# Patient Record
Sex: MALE | Race: WHITE
[De-identification: names, ages, dates, MRNs, and addresses within clinical notes are randomized per-mention and may not be internally consistent; named-entity substitution may affect disease eponyms.]

---

## 2018-01-17 ENCOUNTER — HOSPITAL ENCOUNTER (INPATIENT)
Dept: HOSPITAL 74 - JER | Age: 63
LOS: 2 days | Discharge: HOME | DRG: 603 | End: 2018-01-19
Attending: FAMILY MEDICINE | Admitting: FAMILY MEDICINE
Payer: COMMERCIAL

## 2018-01-17 VITALS — BODY MASS INDEX: 32.5 KG/M2

## 2018-01-17 DIAGNOSIS — I10: ICD-10-CM

## 2018-01-17 DIAGNOSIS — Z79.84: ICD-10-CM

## 2018-01-17 DIAGNOSIS — E11.621: ICD-10-CM

## 2018-01-17 DIAGNOSIS — E11.65: ICD-10-CM

## 2018-01-17 DIAGNOSIS — Z87.891: ICD-10-CM

## 2018-01-17 DIAGNOSIS — L97.529: ICD-10-CM

## 2018-01-17 DIAGNOSIS — L03.116: Primary | ICD-10-CM

## 2018-01-17 LAB
ALBUMIN SERPL-MCNC: 3.5 G/DL (ref 3.4–5)
ALP SERPL-CCNC: 67 U/L (ref 45–117)
ALT SERPL-CCNC: 25 U/L (ref 12–78)
ANION GAP SERPL CALC-SCNC: 9 MMOL/L (ref 8–16)
AST SERPL-CCNC: 11 U/L (ref 15–37)
BASOPHILS # BLD: 0.3 % (ref 0–2)
BILIRUB SERPL-MCNC: 0.4 MG/DL (ref 0.2–1)
BUN SERPL-MCNC: 24 MG/DL (ref 7–18)
CALCIUM SERPL-MCNC: 8.9 MG/DL (ref 8.5–10.1)
CHLORIDE SERPL-SCNC: 96 MMOL/L (ref 98–107)
CO2 SERPL-SCNC: 29 MMOL/L (ref 21–32)
CREAT SERPL-MCNC: 1.2 MG/DL (ref 0.7–1.3)
DEPRECATED RDW RBC AUTO: 12.9 % (ref 11.9–15.9)
EOSINOPHIL # BLD: 1.4 % (ref 0–4.5)
GLUCOSE SERPL-MCNC: 313 MG/DL (ref 74–106)
HCT VFR BLD CALC: 44.8 % (ref 35.4–49)
HGB BLD-MCNC: 15.1 GM/DL (ref 11.7–16.9)
INR BLD: 0.98 (ref 0.82–1.09)
LYMPHOCYTES # BLD: 24.5 % (ref 8–40)
MCH RBC QN AUTO: 30.1 PG (ref 25.7–33.7)
MCHC RBC AUTO-ENTMCNC: 33.7 G/DL (ref 32–35.9)
MCV RBC: 89.4 FL (ref 80–96)
MONOCYTES # BLD AUTO: 9.2 % (ref 3.8–10.2)
NEUTROPHILS # BLD: 64.6 % (ref 42.8–82.8)
PLATELET # BLD AUTO: 162 K/MM3 (ref 134–434)
PMV BLD: 9.2 FL (ref 7.5–11.1)
POTASSIUM SERPLBLD-SCNC: 3.8 MMOL/L (ref 3.5–5.1)
PROT SERPL-MCNC: 6.8 G/DL (ref 6.4–8.2)
PT PNL PPP: 11.1 SEC (ref 9.98–11.88)
RBC # BLD AUTO: 5.01 M/MM3 (ref 4–5.6)
SODIUM SERPL-SCNC: 134 MMOL/L (ref 136–145)
WBC # BLD AUTO: 7.8 K/MM3 (ref 4–10)

## 2018-01-17 PROCEDURE — G0480 DRUG TEST DEF 1-7 CLASSES: HCPCS

## 2018-01-17 RX ADMIN — INSULIN ASPART SCH: 100 INJECTION, SOLUTION INTRAVENOUS; SUBCUTANEOUS at 23:44

## 2018-01-17 RX ADMIN — INSULIN ASPART SCH UNIT: 100 INJECTION, SOLUTION INTRAVENOUS; SUBCUTANEOUS at 18:58

## 2018-01-17 RX ADMIN — ROSUVASTATIN CALCIUM SCH: 5 TABLET, FILM COATED ORAL at 23:44

## 2018-01-17 RX ADMIN — CEFTRIAXONE SCH MLS/HR: 2 INJECTION, SOLUTION INTRAVENOUS at 23:50

## 2018-01-17 RX ADMIN — RANOLAZINE SCH: 500 TABLET, FILM COATED, EXTENDED RELEASE ORAL at 23:45

## 2018-01-17 RX ADMIN — METFORMIN HYDROCHLORIDE SCH MG: 500 TABLET ORAL at 18:18

## 2018-01-17 NOTE — PDOC
History of Present Illness





- General


Chief Complaint: Wound Infection


Stated Complaint: PT SENT BY PCP


Time Seen by Provider: 01/17/18 12:26


History Source: Patient


Exam Limitations: No Limitations





- History of Present Illness


Initial Comments: 





01/17/18 12:47


Patient is a 62-year-old male past medical history of an IDDM, hypertension, 

who presents emergency department today for evaluation of his wound. Patient 

states he was seen in his podiatrist office (Dr. Solano) yesterday and was 

found to have a diabetic foot ulcer to the bottom of his left foot. Upon 

further evaluation of his foot, his podiatrist also found cellulitis. Told 

patient he should present to the emergency department for IV antibiotics. 

Patient states that he has some pain to the left foot however he otherwise 

feels fine. Denies fevers, chills, flulike symptoms, chest pain, shortness of 

breath, nausea, vomiting, diarrhea, headache, dizziness and lightheadedness.





Past History





- Travel


Traveled outside of the country in the last 30 days: No


Close contact w/someone who was outside of country & ill: No





- Past Medical History


Allergies/Adverse Reactions: 


 Allergies











Allergy/AdvReac Type Severity Reaction Status Date / Time


 


No Known Allergies Allergy   Verified 01/17/18 09:37











Home Medications: 


Ambulatory Orders





Alfuzosin HCl [Alfuzosin HCl ER] 10 mg PO DAILY 01/17/18 


Dapagliflozin Propanediol [Farxiga] 10 mg PO DAILY 01/17/18 


Dulaglutide [Trulicity] 0.75 mg SCJ WEEKLY 01/17/18 


Glimepiride 4 mg PO BID 01/17/18 


Levothyroxine [Synthroid -] 50 mcg PO DAILY 01/17/18 


Losartan-Hctz 100-25 mg Tab 100 mg PO DAILY 01/17/18 


Metformin HCl [Glucophage] 1,000 mg PO BID 01/17/18 


Ranolazine [Ranexa] 500 mg PO DAILY 01/17/18 


Rosuvastatin [Crestor -] 5 mg PO HS 01/17/18 








CVA: No


COPD: No


Diabetes: Yes


HTN: Yes


Hypercholesterolemia: Yes


Thyroid Disease: Yes





- Suicide/Smoking/Psychosocial Hx


Smoking Status: No


Smoking History: Former smoker


Have you smoked in the past 12 months: No


Number of Cigarettes Smoked Daily: 0


If you are a former smoker, when did you quit?: 28 YRS


Information on smoking cessation initiated: No


Hx Alcohol Use: Yes (SOCIAL)


Drug/Substance Use Hx: No


Substance Use Type: None





**Review of Systems





- Review of Systems


Able to Perform ROS?: Yes


Comments:: 





01/17/18 13:31


CONSTITUTIONAL: 


Absent: fever, chills, diaphoresis, generalized weakness, malaise, loss of 

appetite


HEENT: 


Absent: rhinorrhea, nasal congestion, throat pain, throat swelling, difficulty 

swallowing, mouth swelling, ear pain, eye pain, visual Changes


CARDIOVASCULAR: 


Absent: chest pain, loss of consciousness, palpitations, irregular heart rate, 

peripheral edema


RESPIRATORY: 


Absent: cough, shortness of breath, dyspnea with exertion, orthopnea, wheezing, 

stridor, hemoptysis


GASTROINTESTINAL:


Absent: abdominal pain, abdominal distension, nausea, vomiting, diarrhea, 

constipation, melena, hematochezia


GENITOURINARY: 


Absent: dysuria, frequency, urgency, hesitancy, hematuria, flank pain, genital 

pain


MUSCULOSKELETAL: 


Absent: myalgia, arthralgia, joint swelling


SKIN: 


Present: foot ulcer L. Absent: rash, itching, pallor


HEMATOLOGIC/IMMUNOLOGIC: 


Absent: easy bleeding, easy bruising, lymphadenopathy, frequent infections


ENDOCRINE:


Absent: unexplained weight gain, unexplained weight loss, heat intolerance, 

cold intolerance


NEUROLOGIC: 


Absent: headache, focal weakness or paresthesias, dizziness, unsteady gait, 

seizure, mental status changes, bladder or bowel incontinence


PSYCHIATRIC: 


Absent: anxiety, depression, suicidal or homicidal ideation, hallucinations.


Is the patient limited English proficient: No





*Physical Exam





- Vital Signs


 Last Vital Signs











Temp Pulse Resp BP Pulse Ox


 


 98.0 F   104 H  20   133/76   97 


 


 01/17/18 09:34  01/17/18 09:34  01/17/18 09:34  01/17/18 09:34  01/17/18 09:34














- Physical Exam


Comments: 





01/17/18 13:32


GENERAL:


Well developed, well nourished. Awake and alert x3. No acute distress. 

Breathing easily on exam bed. 


HEENT:


Normocephalic, atraumatic. PERRLA, EOMI. No conjunctival pallor. Sclera are non-

icteric. Moist mucous membranes. Oropharynx is clear.


NECK: 


Supple. Full ROM. No JVD. Carotid pulses 2+ and symmetric, without bruits. No 

thyromegaly. No lymphadenopathy.


CARDIOVASCULAR:


Regular rate and rhythm. No murmurs, rubs, or gallops. Distal pulses are 2+ and 

symmetric. 


PULMONARY: 


No evidence of respiratory distress. Lungs clear to auscultation bilaterally. 

No wheezing, rales or rhonchi.


ABDOMINAL:


Soft. Non-tender. Non-distended. No rebound or guarding. No organomegaly. 

Normoactive bowel sounds. 


MUSCULOSKELETAL 


Normal range of motion at all joints. No bony deformities or tenderness. No CVA 

tenderness.


EXTREMITIES: 


No cyanosis. No clubbing. No edema. No calf tenderness.


SKIN: 


2cm round stage II/III diabetic foot ulcer to the sole of the L foot. Streaking 

cellulitis to the sole of the foot and ankle. No crepitus felt on exam.Warm and 

dry. Normal capillary refill. No rashes. No jaundice. 


NEUROLOGICAL: 


Alert, awake, appropriate. Cranial nerves 2-12 intact. No deficits to light 

touch and temperature in face, upper extremities and lower extremities. No 

motor deficits in the in face, upper extremities and lower extremities. 

Normoreflexic in the upper and lower extremities. Normal speech. Toes are down-

going bilaterally. Gait is normal without ataxia.


PSYCHIATRIC: 


Cooperative. Good eye contact. Appropriate mood and affect.





ED Treatment Course





- LABORATORY


CBC & Chemistry Diagram: 


 01/17/18 13:25





 01/17/18 13:25





Medical Decision Making





- Medical Decision Making





01/17/18 13:26


Patient is a 62-year-old male past medical history of NIDDM, hypertension, who 

presents to the emergency department for infected foot ulcer of his left sole 

with associated cellulitis. Patient is currently afebrile in the department 

vital signs are stable. We will draw basic lab work, blood cultures and start 

IV antibiotics at this time. Anticipate admission for further management of his 

foot ulcer.





1.CBC, CMP, PT/INR, blood cultures


2.wound culture


3.IV Zosyn and Vanc


4.reevaluate





01/17/18 14:46


No leukocytosis, blood glucose is 300 at this time. We'll order 5 units of 

insulin. Other electrolytes are within normal limits. 


Dr. Jaimes paged for admission.





01/17/18 15:01


Spoke with Dr. Jaimes, case discussed and accepts pt for admission. Requests 

Dr. Oscar's group for ID consult.











*DC/Admit/Observation/Transfer


Diagnosis at time of Disposition: 


 Cellulitis of left foot





Diabetic foot ulcer


Qualifiers:


 Diabetic foot ulcer location: midfoot Diabetes mellitus type: type 2 Laterality

: left Non-pressure ulcer stage: with muscle involvement without evidence of 

necrosis Qualified Code(s): E11.621 - Type 2 diabetes mellitus with foot ulcer





Diabetes type 2, uncontrolled


Qualifiers:


 Diabetes mellitus complication status: with skin complications Diabetes 

mellitus complication detail: with foot ulcer Diabetes mellitus long term 

insulin use: without long term use Qualified Code(s): E11.621 - Type 2 diabetes 

mellitus with foot ulcer








- Discharge Dispostion


Condition at time of disposition: Stable


Admit: Yes





- Referrals





- Patient Instructions





- Post Discharge Activity

## 2018-01-18 LAB
ALBUMIN SERPL-MCNC: 3.5 G/DL (ref 3.4–5)
ALP SERPL-CCNC: 56 U/L (ref 45–117)
ALT SERPL-CCNC: 23 U/L (ref 12–78)
ANION GAP SERPL CALC-SCNC: 9 MMOL/L (ref 8–16)
APPEARANCE UR: CLEAR
AST SERPL-CCNC: 10 U/L (ref 15–37)
BILIRUB SERPL-MCNC: 0.3 MG/DL (ref 0.2–1)
BILIRUB UR STRIP.AUTO-MCNC: NEGATIVE MG/DL
BUN SERPL-MCNC: 23 MG/DL (ref 7–18)
CALCIUM SERPL-MCNC: 8.8 MG/DL (ref 8.5–10.1)
CHLORIDE SERPL-SCNC: 101 MMOL/L (ref 98–107)
CHOLEST SERPL-MCNC: 161 MG/DL (ref 50–200)
CO2 SERPL-SCNC: 29 MMOL/L (ref 21–32)
COLOR UR: YELLOW
CREAT SERPL-MCNC: 0.9 MG/DL (ref 0.7–1.3)
DEPRECATED RDW RBC AUTO: 12.8 % (ref 11.9–15.9)
EPITH CASTS URNS QL MICRO: (no result) /HPF
GLUCOSE SERPL-MCNC: 180 MG/DL (ref 74–106)
HCT VFR BLD CALC: 45.2 % (ref 35.4–49)
HDLC SERPL-MCNC: 47 MG/DL (ref 40–60)
HGB BLD-MCNC: 15.2 GM/DL (ref 11.7–16.9)
HYALINE CASTS URNS QL MICRO: 1 /LPF
KETONES UR QL STRIP: NEGATIVE
LDLC SERPL CALC-MCNC: 90 MG/DL (ref 5–100)
LEUKOCYTE ESTERASE UR QL STRIP.AUTO: (no result)
MCH RBC QN AUTO: 30.2 PG (ref 25.7–33.7)
MCHC RBC AUTO-ENTMCNC: 33.6 G/DL (ref 32–35.9)
MCV RBC: 89.9 FL (ref 80–96)
MUCOUS THREADS URNS QL MICRO: (no result)
NITRITE UR QL STRIP: NEGATIVE
PH UR: 5 [PH] (ref 5–8)
PLATELET # BLD AUTO: 158 K/MM3 (ref 134–434)
PMV BLD: 8.9 FL (ref 7.5–11.1)
POTASSIUM SERPLBLD-SCNC: 4 MMOL/L (ref 3.5–5.1)
PROT SERPL-MCNC: 6.6 G/DL (ref 6.4–8.2)
PROT UR QL STRIP: (no result)
PROT UR QL STRIP: NEGATIVE
RBC # BLD AUTO: 5.03 M/MM3 (ref 4–5.6)
RBC # UR STRIP: NEGATIVE /UL
SODIUM SERPL-SCNC: 139 MMOL/L (ref 136–145)
SP GR UR: 1.03 (ref 1–1.03)
TRIGL SERPL-MCNC: 166 MG/DL (ref 35–160)
UROBILINOGEN UR STRIP-MCNC: NEGATIVE MG/DL (ref 0.2–1)
WBC # BLD AUTO: 6.6 K/MM3 (ref 4–10)

## 2018-01-18 RX ADMIN — LOSARTAN POTASSIUM AND HYDROCHLOROTHIAZIDE TABLETS SCH TAB: 50; 12.5 TABLET, FILM COATED ORAL at 10:35

## 2018-01-18 RX ADMIN — INSULIN ASPART SCH UNIT: 100 INJECTION, SOLUTION INTRAVENOUS; SUBCUTANEOUS at 16:58

## 2018-01-18 RX ADMIN — INSULIN ASPART SCH UNIT: 100 INJECTION, SOLUTION INTRAVENOUS; SUBCUTANEOUS at 06:33

## 2018-01-18 RX ADMIN — RANOLAZINE SCH MG: 500 TABLET, FILM COATED, EXTENDED RELEASE ORAL at 10:35

## 2018-01-18 RX ADMIN — TAZOBACTAM SODIUM AND PIPERACILLIN SODIUM SCH: 500; 4 INJECTION, SOLUTION INTRAVENOUS at 18:15

## 2018-01-18 RX ADMIN — TAZOBACTAM SODIUM AND PIPERACILLIN SODIUM SCH MLS/HR: 500; 4 INJECTION, SOLUTION INTRAVENOUS at 15:29

## 2018-01-18 RX ADMIN — RANOLAZINE SCH MG: 500 TABLET, FILM COATED, EXTENDED RELEASE ORAL at 21:17

## 2018-01-18 RX ADMIN — INSULIN ASPART SCH UNIT: 100 INJECTION, SOLUTION INTRAVENOUS; SUBCUTANEOUS at 21:18

## 2018-01-18 RX ADMIN — INSULIN ASPART SCH UNIT: 100 INJECTION, SOLUTION INTRAVENOUS; SUBCUTANEOUS at 11:24

## 2018-01-18 RX ADMIN — CEFTRIAXONE SCH MLS/HR: 2 INJECTION, SOLUTION INTRAVENOUS at 10:36

## 2018-01-18 RX ADMIN — METFORMIN HYDROCHLORIDE SCH MG: 500 TABLET ORAL at 06:33

## 2018-01-18 RX ADMIN — ROSUVASTATIN CALCIUM SCH MG: 5 TABLET, FILM COATED ORAL at 21:17

## 2018-01-18 RX ADMIN — GLIMEPIRIDE SCH MG: 4 TABLET ORAL at 16:58

## 2018-01-18 NOTE — HP
Admitting History and Physical





- Primary Care Physician


PCP: Mei Jaimes





- Admission


Chief Complaint: Diabetic foot ulcer


History Source: Patient, Medical Record


Limitations to Obtaining History: No Limitations





- Past Medical History


Cardiovascular: Yes: HTN, Hyperlipdemia, Other (Angina)


Endocrine: Yes: Diabetes Mellitus, Hypothyroidism





- Past Surgical History


Past Surgical History: Yes: None





- Smoking History


Smoking history: Former smoker


Have you smoked in the past 12 months: No


Aproximately how many cigarettes per day: 0


If you are a former smoker, when did you quit?: 28 YRS





- Alcohol/Substance Use


Hx Alcohol Use: Yes (SOCIAL)





- Social History


ADL: Independent


Occupation: retired


History of Recent Travel: No





Home Medications





- Allergies


Allergies/Adverse Reactions: 


 Allergies











Allergy/AdvReac Type Severity Reaction Status Date / Time


 


No Known Allergies Allergy   Verified 01/17/18 09:37














- Home Medications


Home Medications: 


Ambulatory Orders





Alfuzosin HCl [Alfuzosin HCl ER] 10 mg PO DAILY 01/17/18 


Dapagliflozin Propanediol [Farxiga] 10 mg PO DAILY 01/17/18 


Dulaglutide [Trulicity] 0.75 mg SCJ WEEKLY 01/17/18 


Glimepiride 4 mg PO BID 01/17/18 


Levothyroxine [Synthroid -] 50 mcg PO DAILY 01/17/18 


Losartan-Hctz 100-25 mg Tab 100 mg PO DAILY 01/17/18 


Metformin HCl [Glucophage] 1,000 mg PO BID 01/17/18 


Ranolazine [Ranexa] 500 mg PO DAILY 01/17/18 


Rosuvastatin [Crestor -] 5 mg PO HS 01/17/18 











Review of Systems





- Review of Systems


Constitutional: reports: No Symptoms


Eyes: reports: No Symptoms


HENT: reports: No Symptoms


Neck: reports: No Symptoms


Cardiovascular: reports: No Symptoms


Respiratory: reports: No Symptoms


Gastrointestinal: reports: No Symptoms


Genitourinary: reports: No Symptoms


Breasts: reports: No Symptoms Reported


Musculoskeletal: reports: No Symptoms


Integumentary: reports: Erythema, Wound


Neurological: reports: No Symptoms


Endocrine: reports: No Symptoms


Hematology/Lymphatic: reports: No Symptoms


Psychiatric: reports: No Symptoms





Physical Examination


Vital Signs: 


 Vital Signs











Temperature  98.6 F   01/18/18 08:46


 


Pulse Rate  93 H  01/18/18 08:46


 


Respiratory Rate  16   01/18/18 08:46


 


Blood Pressure  139/80   01/18/18 08:46


 


O2 Sat by Pulse Oximetry (%)  98   01/18/18 00:00











Findings/Remarks: 





NAD, sitting in the chair


IV abx


seen by ID


afebrile


Constitutional: Yes: Well Nourished, No Distress, Calm


Cardiovascular: Yes: Regular Rate and Rhythm


Respiratory: Yes: Regular


Gastrointestinal: Yes: Normal Bowel Sounds, Soft


Musculoskeletal: Yes: WNL


Extremities: Yes: Erythema (Left dorsal foot)


Edema: No


Peripheral Pulses WNL: Yes


Neurological: Yes: Alert, Oriented


Psychiatric: Yes: Alert, Oriented


Labs: 


 CBC, BMP





 01/18/18 08:00 





 01/18/18 08:00 











Imaging





- Results


X-ray: Report Reviewed





Problem List





- Problems


(1) Cellulitis of left foot


Assessment/Plan: 


ID consult


-IV abx


-Podiatry consult


-pain management


-MRI left foot


Code(s): L03.116 - CELLULITIS OF LEFT LOWER LIMB   





(2) Diabetes type 2, uncontrolled


Assessment/Plan: 


-endocrinology consult


-insulin sliding scale


-Diabetic diet


-metformin on hold during inpatient stay


-on glimepride and farxiga outpatient, continue glimeperide inpatient, hospital 

doesn't carry any SGLT2 inhibitors


Code(s): E11.65 - TYPE 2 DIABETES MELLITUS WITH HYPERGLYCEMIA   


Qualifiers: 


   Diabetes mellitus complication status: with skin complications   Diabetes 

mellitus complication detail: with foot ulcer   Diabetes mellitus long term 

insulin use: without long term use   Qualified Code(s): E11.621 - Type 2 

diabetes mellitus with foot ulcer; E11.65 - Type 2 diabetes mellitus with 

hyperglycemia; E11.65 - Type 2 diabetes mellitus with hyperglycemia; E11.65 - 

Type 2 diabetes mellitus with hyperglycemia; E11.65 - Type 2 diabetes mellitus 

with hyperglycemia; L97.509 - Non-pressure chronic ulcer of other part of 

unspecified foot with unspecified severity; L97.509 - Non-pressure chronic 

ulcer of other part of unspecified foot with unspecified severity; L97.509 - Non

-pressure chronic ulcer of other part of unspecified foot with unspecified 

severity; L97.509 - Non-pressure chronic ulcer of other part of unspecified 

foot with unspecified severity   





Assessment/Plan





see problem list

## 2018-01-18 NOTE — PN
Progress Note, Physician


Chief Complaint: 





ID











Vancomycin   Ceftriaxone





No complaints





 





- Current Medication List


Current Medications: 


Active Medications





Acetaminophen (Tylenol -)  650 mg PO Q6H PRN


   PRN Reason: PAIN OR FEVER


Glimepiride (Amaryl -)  4 mg PO BIDI [facility]


HCTZ/Losartan Potassium (Hyzaar -)  1 tab PO DAILY [facility]


   Last Admin: 01/18/18 10:35 Dose:  1 tab


CEFTRIAXONE IN IS-OSM DEXTROSE (Ceftriaxone 2 Gm-D5w Bag)  2 gm in 50 mls @ 100 

mls/hr IVPB DAILY [facility]


   Last Admin: 01/18/18 10:56 Dose:  Not Given


Vancomycin HCl 1,250 mg/ (Sodium Chloride)  250 mls @ 166.667 mls/hr IVPB DAILY@

1500 [facility]


   PRN Reason: Protocol


Insulin Aspart (Novolog Vial Sliding Scale -)  1 vial SQ ACHS [facility]


   PRN Reason: Protocol


   Last Admin: 01/18/18 11:24 Dose:  6 unit


Levothyroxine Sodium (Synthroid -)  50 mcg PO AM [facility]


Ranolazine (Ranexa -)  500 mg PO BID [facility]


   Last Admin: 01/18/18 10:35 Dose:  500 mg


Rosuvastatin Calcium (Crestor -)  5 mg PO HS [facility]


   Last Admin: 01/17/18 23:44 Dose:  Not Given


Tamsulosin HCl (Flomax -)  0.4 mg PO DAILY@0830 [facility]











- Objective


Vital Signs: 


 Vital Signs











Temperature  98.6 F   01/18/18 08:46


 


Pulse Rate  93 H  01/18/18 08:46


 


Respiratory Rate  16   01/18/18 08:46


 


Blood Pressure  139/80   01/18/18 08:46


 


O2 Sat by Pulse Oximetry (%)  99   01/18/18 09:00











HENT: Yes: WNL, Atraumatic


Neck: Yes: WNL, Supple


Cardiovascular: Yes: S1, S2


Respiratory: Yes: WNL, Regular, CTA Bilaterally


Gastrointestinal: Yes: WNL, Normal Bowel Sounds, Soft.  No: Tenderness


Extremities: Yes: Other (Plantar ulcer swellling redness better minimal drainage

)


Labs: 


 CBC, BMP





 01/18/18 08:00 





 01/18/18 08:00 





 INR, PTT











INR  0.98  (0.82-1.09)   01/17/18  13:25    














Assessment/Plan





Microbiology





01/17/18 13:25   Blood - Peripheral Venous   Blood Culture - Preliminary


                              NO GROWTH OBTAINED AFTER 24 HOURS, INCUBATION TO 

CONTINUE


                              FOR 4 DAYS.





 Laboratory Tests











  01/18/18 01/18/18 01/18/18





  08:00 08:00 08:00


 


Hgb  15.2  


 


ESR    23 H


 


BUN   23 H 


 


Creatinine   0.9  D 








Assessment Diabetic foot infection plantar ulcer cellultitis rule out osteo ESR 

CRP low but not diagnostic for osteo





Plan             Continue Antibiotics


                   Vancom Zosyn


                   MRI foot rule out osteo

## 2018-01-19 VITALS — DIASTOLIC BLOOD PRESSURE: 68 MMHG | HEART RATE: 86 BPM | SYSTOLIC BLOOD PRESSURE: 108 MMHG

## 2018-01-19 VITALS — TEMPERATURE: 97.6 F

## 2018-01-19 RX ADMIN — TAZOBACTAM SODIUM AND PIPERACILLIN SODIUM SCH MLS/HR: 500; 4 INJECTION, SOLUTION INTRAVENOUS at 01:33

## 2018-01-19 RX ADMIN — RANOLAZINE SCH MG: 500 TABLET, FILM COATED, EXTENDED RELEASE ORAL at 10:13

## 2018-01-19 RX ADMIN — LOSARTAN POTASSIUM AND HYDROCHLOROTHIAZIDE TABLETS SCH TAB: 50; 12.5 TABLET, FILM COATED ORAL at 10:13

## 2018-01-19 RX ADMIN — TAZOBACTAM SODIUM AND PIPERACILLIN SODIUM SCH MLS/HR: 500; 4 INJECTION, SOLUTION INTRAVENOUS at 10:13

## 2018-01-19 RX ADMIN — GLIMEPIRIDE SCH MG: 4 TABLET ORAL at 06:18

## 2018-01-19 RX ADMIN — INSULIN ASPART SCH UNIT: 100 INJECTION, SOLUTION INTRAVENOUS; SUBCUTANEOUS at 06:19

## 2018-01-19 RX ADMIN — INSULIN ASPART SCH UNIT: 100 INJECTION, SOLUTION INTRAVENOUS; SUBCUTANEOUS at 11:35

## 2018-01-19 NOTE — DS
Physical Examination


Vital Signs: 


 Vital Signs











Temperature  98.5 F   01/19/18 05:54


 


Pulse Rate  82   01/19/18 05:54


 


Respiratory Rate  20   01/19/18 05:54


 


Blood Pressure  125/63   01/19/18 05:54


 


O2 Sat by Pulse Oximetry (%)  99   01/18/18 20:46











Constitutional: Yes: Well Nourished, No Distress, Calm


Cardiovascular: Yes: Regular Rate and Rhythm


Respiratory: Yes: Regular


Musculoskeletal: Yes: WNL


Extremities: Yes: WNL


Edema: No


Peripheral Pulses WNL: Yes


Neurological: Yes: Alert, Oriented


Psychiatric: Yes: Alert, Oriented


Labs: 


 CBC, BMP





 01/18/18 08:00 





 01/18/18 08:00 











Discharge Summary


Reason For Visit: DIABETIC FOOT ULCER


Current Active Problems





Cellulitis of left foot (Acute)


Diabetes type 2, uncontrolled (Acute)


Diabetic foot ulcer (Acute)








Hospital Course: 





Patient is a 62-year-old male past medical history of an IDDM, hypertension, 

who presents emergency department today for evaluation of his wound. Patient 

states he was seen in his podiatrist office (Dr. Solano) yesterday and was 

found to have a diabetic foot ulcer to the bottom of his left foot. Upon 

further evaluation of his foot, his podiatrist also found cellulitis. Told 

patient he should present to the emergency department for IV antibiotics. 

Patient states that he has some pain to the left foot however he otherwise 

feels fine. Denies fevers, chills, flulike symptoms, chest pain, shortness of 

breath, nausea, vomiting, diarrhea, headache, dizziness and lightheadedness.


During his stay he was evaluated by ID and was treated with IV abx-Zosyn and 

vancomycin. The wound culture is pending. he also had MRI LLE which could not 

exclude osteomyelitis.


Condition: Stable





- Instructions


Diet, Activity, Other Instructions: 


-Low sodium diabetic diet


-Augmentin 875-125 mg 2 x day for 7 days


-Follow up with Podiatry Dr Mcgowan within 1 week


-Follow up with PCP within 2 weeks


Referrals: 


Mei Jaimes MD [Primary Care Provider] - 


Disposition: VNS/HOME HEALTH CARE





- Home Medications


Comprehensive Discharge Medication List: 


Ambulatory Orders





Alfuzosin HCl [Alfuzosin HCl ER] 10 mg PO DAILY 01/17/18 


Dapagliflozin Propanediol [Farxiga] 10 mg PO DAILY 01/17/18 


Dulaglutide [Trulicity] 0.75 mg SCJ WEEKLY 01/17/18 


Glimepiride 4 mg PO BID 01/17/18 


Levothyroxine [Synthroid -] 50 mcg PO DAILY 01/17/18 


Losartan-Hctz 100-25 mg Tab 100 mg PO DAILY 01/17/18 


Metformin HCl [Glucophage] 1,000 mg PO BID 01/17/18 


Ranolazine [Ranexa] 500 mg PO DAILY 01/17/18 


Rosuvastatin [Crestor -] 5 mg PO HS 01/17/18

## 2018-01-19 NOTE — PN
Progress Note, Physician


Chief Complaint: 





Left foot diabetic ulcer


History of Present Illness: 





NAD, self ambulatory


seen by ID


IV abx


MRI possible osteo?





- Current Medication List


Current Medications: 


Active Medications





Acetaminophen (Tylenol -)  650 mg PO Q6H PRN


   PRN Reason: PAIN OR FEVER


Glimepiride (Amaryl -)  4 mg PO BIDI [facility]


   Last Admin: 01/19/18 06:18 Dose:  4 mg


HCTZ/Losartan Potassium (Hyzaar -)  1 tab PO DAILY [facility]


   Last Admin: 01/19/18 10:13 Dose:  1 tab


Vancomycin HCl 1,250 mg/ (Sodium Chloride)  250 mls @ 166.667 mls/hr IVPB DAILY@

1500 [facility]


   PRN Reason: Protocol


   Last Admin: 01/18/18 18:57 Dose:  166.667 mls/hr


Piperacillin/Tazobactam/Dextrose (Zosyn 4.5gm Ivpb (Premix))  4.5 gm in 100 mls 

@ 200 mls/hr IVPB Q8H-IV [facility]


   PRN Reason: Protocol


   Last Admin: 01/19/18 10:13 Dose:  200 mls/hr


Insulin Aspart (Novolog Vial Sliding Scale -)  1 vial SQ ACHS [facility]


   PRN Reason: Protocol


   Last Admin: 01/19/18 06:19 Dose:  2 unit


Levothyroxine Sodium (Synthroid -)  50 mcg PO AM [facility]


   Last Admin: 01/19/18 06:18 Dose:  50 mcg


Ranolazine (Ranexa -)  500 mg PO BID [facility]


   Last Admin: 01/19/18 10:13 Dose:  500 mg


Rosuvastatin Calcium (Crestor -)  10 mg PO [facility]


Tamsulosin HCl (Flomax -)  0.4 mg PO DAILY@0830 [facility]


   Last Admin: 01/19/18 10:13 Dose:  0.4 mg











- Objective


Vital Signs: 


 Vital Signs











Temperature  98.5 F   01/19/18 05:54


 


Pulse Rate  82   01/19/18 05:54


 


Respiratory Rate  20   01/19/18 05:54


 


Blood Pressure  125/63   01/19/18 05:54


 


O2 Sat by Pulse Oximetry (%)  99   01/18/18 20:46











Constitutional: Yes: Well Nourished, No Distress, Calm


Cardiovascular: Yes: Regular Rate and Rhythm


Respiratory: Yes: Regular


Gastrointestinal: Yes: Normal Bowel Sounds, Soft


Musculoskeletal: Yes: WNL


Extremities: Yes: WNL


Edema: No


Peripheral Pulses WNL: Yes


Wound/Incision: Yes: Dressing Dry and Intact


Neurological: Yes: Alert, Oriented


Psychiatric: Yes: Alert, Oriented


Labs: 


 CBC, BMP





 01/18/18 08:00 





 01/18/18 08:00 





 INR, PTT











INR  0.98  (0.82-1.09)   01/17/18  13:25    














Problem List





- Problems


(1) Cellulitis of left foot


Assessment/Plan: 


ID consult


-IV abx


-Awaiting Podiatry consult


-pain management


-MRI left foot


Code(s): L03.116 - CELLULITIS OF LEFT LOWER LIMB   





(2) Diabetes type 2, uncontrolled


Assessment/Plan: 


-endocrinology consult


-insulin sliding scale


-Diabetic diet


-metformin on hold during inpatient stay


-on glimepride and farxiga outpatient, continue glimeperide inpatient, hospital 

doesn't carry any SGLT2 inhibitors


Code(s): E11.65 - TYPE 2 DIABETES MELLITUS WITH HYPERGLYCEMIA   


Qualifiers: 


   Diabetes mellitus complication status: with skin complications   Diabetes 

mellitus complication detail: with foot ulcer   Diabetes mellitus long term 

insulin use: without long term use   Qualified Code(s): E11.621 - Type 2 

diabetes mellitus with foot ulcer; E11.65 - Type 2 diabetes mellitus with 

hyperglycemia; E11.65 - Type 2 diabetes mellitus with hyperglycemia; E11.65 - 

Type 2 diabetes mellitus with hyperglycemia; E11.65 - Type 2 diabetes mellitus 

with hyperglycemia; L97.509 - Non-pressure chronic ulcer of other part of 

unspecified foot with unspecified severity; L97.509 - Non-pressure chronic 

ulcer of other part of unspecified foot with unspecified severity; L97.509 - Non

-pressure chronic ulcer of other part of unspecified foot with unspecified 

severity; L97.509 - Non-pressure chronic ulcer of other part of unspecified 

foot with unspecified severity   





Assessment/Plan





see problem list


Plan abx as per ID

## 2018-01-20 LAB — MICROALBUMIN/CREAT UR: 52.8 MG/G CREAT (ref 0–30)

## 2018-06-03 ENCOUNTER — HOSPITAL ENCOUNTER (EMERGENCY)
Dept: HOSPITAL 74 - JER | Age: 63
Discharge: HOME | End: 2018-06-03
Payer: COMMERCIAL

## 2018-06-03 VITALS — BODY MASS INDEX: 32.5 KG/M2

## 2018-06-03 VITALS — DIASTOLIC BLOOD PRESSURE: 92 MMHG | SYSTOLIC BLOOD PRESSURE: 148 MMHG | HEART RATE: 86 BPM

## 2018-06-03 VITALS — TEMPERATURE: 97 F
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
## 2018-06-03 DIAGNOSIS — E78.00: ICD-10-CM
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
## 2018-06-03 DIAGNOSIS — M25.561: Primary | ICD-10-CM

## 2018-06-03 DIAGNOSIS — Z79.84: ICD-10-CM

## 2018-06-03 DIAGNOSIS — E11.9: ICD-10-CM

## 2018-06-03 DIAGNOSIS — E03.9: ICD-10-CM

## 2018-06-03 DIAGNOSIS — I10: ICD-10-CM

## 2018-06-03 NOTE — PDOC
History of Present Illness





- General


Chief Complaint: Pain


Stated Complaint: RT KNEE PAIN


Time Seen by Provider: 06/03/18 12:53





- History of Present Illness


Initial Comments: 





06/03/18 13:00


61 yo M with h/o NIDDM, HTN, HLD who p/w R knee pain. Patient reports acute 

onset R knee pain following standing up and falling back onto couch yesterday 

morning. Patient denies twisting foot/knee injury, head/neck/back trauma, or 

LOC. Reports low mechanism of action injury. Patient woke up this AM with 

severe lateral R knee pain. Inability to bear weight on R knee. Denies OTC 

analgesia.  Denies trauma to the knee. 





Denies F/C, N/V,  CP, SOB, abdominal pain, diarrhea, constipation, urinary 

complaints, weakness, lightheadedness, sensory changes. 





PMHx: as noted above. Patient not on antibiotic medication


ROS: as noted above


Allergies: NKDA





Past History





- Past Medical History


Allergies/Adverse Reactions: 


 Allergies











Allergy/AdvReac Type Severity Reaction Status Date / Time


 


No Known Allergies Allergy   Verified 06/03/18 12:29











Home Medications: 


Ambulatory Orders





Alfuzosin HCl [Alfuzosin HCl ER] 10 mg PO DAILY 01/17/18 


Dapagliflozin Propanediol [Farxiga] 10 mg PO DAILY 01/17/18 


Dulaglutide [Trulicity] 0.75 mg SCJ WEEKLY 01/17/18 


Glimepiride 4 mg PO BID 01/17/18 


Levothyroxine [Synthroid -] 50 mcg PO DAILY 01/17/18 


Losartan-Hctz 100-25 mg Tab 100 mg PO DAILY 01/17/18 


Metformin HCl [Glucophage] 1,000 mg PO BID 01/17/18 


Ranolazine [Ranexa] 500 mg PO DAILY 01/17/18 


Rosuvastatin [Crestor -] 5 mg PO HS 01/17/18 


Collagenase Clostridium Hist. [Santyl] 1 applic TP DAILY #90 oint...g. 01/29/18 








CVA: No


COPD: No


Diabetes: Yes


HTN: Yes


Hypercholesterolemia: Yes


Thyroid Disease: Yes (Hypothyroidism)





- Immunization History


Immunization Up to Date: Yes





- Suicide/Smoking/Psychosocial Hx


Smoking Status: No


Smoking History: Never smoked


Have you smoked in the past 12 months: No


Number of Cigarettes Smoked Daily: 0


If you are a former smoker, when did you quit?: 28 YRS


Information on smoking cessation initiated: No


Hx Alcohol Use: Yes (SOCIAL)


Drug/Substance Use Hx: No


Substance Use Type: None





**Review of Systems





- Review of Systems


Comments:: 





06/03/18 13:00





GENERAL/CONSTITUTIONAL: No fever or chills. No weakness.


HEAD, EYES, EARS, NOSE AND THROAT: No change in vision. No ear pain or 

discharge. No sore throat.


CARDIOVASCULAR: No chest pain or shortness of breath


RESPIRATORY: No cough, wheezing, or hemoptysis.


GASTROINTESTINAL: No nausea, vomiting, diarrhea or constipation.


GENITOURINARY: No dysuria, frequency, or change in urination.


MUSCULOSKELETAL:+ R knee pain. No neck or back pain.


SKIN: No rash


NEUROLOGIC: No headache, vertigo, loss of consciousness, or change in strength/

sensation.


ENDOCRINE: No increased thirst. No abnormal weight change


HEMATOLOGIC/LYMPHATIC: No anemia, easy bleeding, or history of blood clots.


ALLERGIC/IMMUNOLOGIC: No hives or skin allergy.








*Physical Exam





- Vital Signs


 Last Vital Signs











Temp Pulse Resp BP Pulse Ox


 


 97 F L  98 H  18   165/98   97 


 


 06/03/18 12:25  06/03/18 12:25  06/03/18 12:25  06/03/18 12:25  06/03/18 12:25














- Physical Exam


Comments: 





06/03/18 13:01


GENERAL: Awake, alert, and fully oriented, in no acute distress


HEAD: No signs of trauma, normocephalic, atraumatic 


EYES: PERRLA, EOMI, sclera anicteric, conjunctiva clear


ENT: Hearing grossly normal, nares patent, oropharynx clear without


exudates. Moist mucosa


NECK: Normal ROM, supple, no lymphadenopathy, JVD, or masses


LUNGS: No distress, speaks full sentences, clear to auscultation bilaterally 


HEART: Regular rate and rhythm, normal S1 and S2, no murmurs, rubs or gallops, 

peripheral pulses normal and equal bilaterally. 


EXTREMITIES : Normal inspection, Normal range of motion, no edema.  No clubbing 

or cyanosis. + left sided limping gait. Boot on L foot, with L sided 1 mm 

healed plantar ulcer. 


R KNEE: R knee biceps femoris tendon ttp. Absent effusion, ecchymosis, varus/

valgus deformity. Absent pretibeal plateau ttp. Limited active and passive ROM d

/t pain. Normal joint laxity. Nml posterior patella fossa. 2+ equal palpable 

and symmetric peripheral DP and PT pulses.  


SKIN: Warm, Dry, normal turgor, no rashes or lesions noted











Medical Decision Making





- Medical Decision Making





06/03/18 13:30


61 yo M with h/o NIDDM, HTN, HLD who p/w R knee pain. Patient reports acute 

onset R knee pain following standing up and falling back onto couch yesterday 

morning. VSS, AF, A%Ox3. No obvious bony deformity on R knee exam. No joint 

effusion, ecchymosis. + R knee biceps femoris tendon ttp. R/o patellar fracture/

dislocation. Will consider tendinous rupture, or knee sprain/strain. Pain 

control, R knee RAD, and reassess. 








ED Course: 


R KNEE RAD 


Naproxen 500 mg 


06/03/18 14:22


R KNEE RAD: No acute pathology. Old avulsion at lateral distal femur. 





Patient stable for d/c with return precautions. Advised to f/u with PMD and 

ortho. Knee immobilizer applied to affected knee. 








*DC/Admit/Observation/Transfer


Diagnosis at time of Disposition: 


Knee pain, acute


Qualifiers:


 Laterality: left Qualified Code(s): M25.562 - Pain in left knee








- Discharge Dispostion


Disposition: HOME


Condition at time of disposition: Stable





- Referrals


Referrals: 


Mei Jaimes MD [Primary Care Provider] - 


Romario Pérez MD [Staff Physician] - 





- Patient Instructions


Printed Discharge Instructions:  DI for Knee Pain


Additional Instructions: 


Please return to the emergency department with any new or worsening symptoms or 

concerns. Please follow up with your primary care physician within 72 hours. 

Can take 600 mg Ibruprofen every 4-6 hours as needed for pain in conjunction 

with Tylenol ( max 3200 mg per day). Please follow up with orthopedics within 

one week. 





- Post Discharge Activity





- Attestations


Physician Attestion: 





06/03/18 13:01


I attest to the information provided in this note.

## 2018-06-03 NOTE — PDOC
Attending Attestation





- Resident


Resident Name: Shaji Hargroveson





- ED Attending Attestation


I have performed the following: I have examined & evaluated the patient, The 

case was reviewed & discussed with the resident, I agree w/resident's findings 

& plan, Exceptions are as noted





- HPI


HPI: 





06/03/18 14:44


59 yo male h/o foot ulcer left foot, here with c/o injury to right knee.  

states he woke up and on standing felt sudden pain in right knee.  feels he was 

bearing weight differently, was concerned about his left plantar foot ulcer.  

no eccymosis, no swelling. pain with straightening.


no f/c no cp no sob. 





- Physicial Exam


PE: 





06/03/18 14:46


awake alert nad. lungs clear bilaterally. heart rrr no mrg. abd soft nt nd. ext 

wwp right knee lateral ttp, no effusion. pain with extension. no warmth. ankle 

and hip NT FROM. left plantar surface foot, ulcer 0.5cm, cdi. no erythema. 





- Medical Decision Making





06/03/18 14:47


63 m s/p right knee injury.  differential: ligmentous or meniscus injury, bony 

injury, plan xray yosef control. crutches knee immobilizer. fu ortho

## 2023-01-31 ENCOUNTER — OFFICE (OUTPATIENT)
Dept: URBAN - METROPOLITAN AREA CLINIC 121 | Facility: CLINIC | Age: 68
Setting detail: OPHTHALMOLOGY
End: 2023-01-31
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
## 2023-01-31 PROCEDURE — NO SHOW FE NO SHOW FEE: Performed by: OPHTHALMOLOGY

## 2023-02-10 ENCOUNTER — OFFICE (OUTPATIENT)
Dept: URBAN - METROPOLITAN AREA CLINIC 121 | Facility: CLINIC | Age: 68
Setting detail: OPHTHALMOLOGY
End: 2023-02-10
Payer: MEDICARE

## 2023-02-10 DIAGNOSIS — H43.813: ICD-10-CM

## 2023-02-10 DIAGNOSIS — H43.393: ICD-10-CM

## 2023-02-10 DIAGNOSIS — H26.493: ICD-10-CM

## 2023-02-10 DIAGNOSIS — H35.3131: ICD-10-CM

## 2023-02-10 DIAGNOSIS — H35.40: ICD-10-CM

## 2023-02-10 DIAGNOSIS — H35.033: ICD-10-CM

## 2023-02-10 DIAGNOSIS — H16.223: ICD-10-CM

## 2023-02-10 DIAGNOSIS — Z96.1: ICD-10-CM

## 2023-02-10 DIAGNOSIS — H40.013: ICD-10-CM

## 2023-02-10 DIAGNOSIS — H35.363: ICD-10-CM

## 2023-02-10 DIAGNOSIS — E11.9: ICD-10-CM

## 2023-02-10 PROCEDURE — 92134 CPTRZ OPH DX IMG PST SGM RTA: CPT | Performed by: OPHTHALMOLOGY

## 2023-02-10 PROCEDURE — 99213 OFFICE O/P EST LOW 20 MIN: CPT | Performed by: OPHTHALMOLOGY

## 2023-02-10 ASSESSMENT — KERATOMETRY
OD_K1POWER_DIOPTERS: 42.00
METHOD_AUTO_MANUAL: AUTO
OS_K2POWER_DIOPTERS: 42.50
OS_K1POWER_DIOPTERS: 41.50
OS_AXISANGLE_DEGREES: 159
OD_K2POWER_DIOPTERS: 42.50
OD_AXISANGLE_DEGREES: 019

## 2023-02-10 ASSESSMENT — SPHEQUIV_DERIVED
OS_SPHEQUIV: -0.25
OD_SPHEQUIV: -0.75

## 2023-02-10 ASSESSMENT — VISUAL ACUITY
OD_BCVA: 20/25
OS_BCVA: 20/30

## 2023-02-10 ASSESSMENT — REFRACTION_AUTOREFRACTION
OD_CYLINDER: +0.50
OS_CYLINDER: +1.00
OD_AXIS: 175
OS_AXIS: 165
OS_SPHERE: -0.75
OD_SPHERE: -1.00

## 2023-02-10 ASSESSMENT — SUPERFICIAL PUNCTATE KERATITIS (SPK)
OS_SPK: T
OD_SPK: 2+

## 2023-02-10 ASSESSMENT — TONOMETRY
OS_IOP_MMHG: 14
OD_IOP_MMHG: 15

## 2023-02-10 ASSESSMENT — PACHYMETRY
OD_CT_UM: 559
OS_CT_UM: 551
OD_CT_CORRECTION: -1
OS_CT_CORRECTION: -1

## 2023-02-10 ASSESSMENT — CONFRONTATIONAL VISUAL FIELD TEST (CVF)
OS_FINDINGS: FULL
OD_FINDINGS: FULL

## 2023-02-10 ASSESSMENT — AXIALLENGTH_DERIVED
OD_AL: 24.368
OS_AL: 24.2586

## 2023-12-06 ENCOUNTER — OFFICE (OUTPATIENT)
Dept: URBAN - METROPOLITAN AREA CLINIC 121 | Facility: CLINIC | Age: 68
Setting detail: OPHTHALMOLOGY
End: 2023-12-06
Payer: MEDICARE

## 2023-12-06 DIAGNOSIS — H40.013: ICD-10-CM

## 2023-12-06 PROCEDURE — 92083 EXTENDED VISUAL FIELD XM: CPT | Performed by: OPHTHALMOLOGY

## 2023-12-06 PROCEDURE — 92133 CPTRZD OPH DX IMG PST SGM ON: CPT | Performed by: OPHTHALMOLOGY

## 2023-12-06 PROCEDURE — 99212 OFFICE O/P EST SF 10 MIN: CPT | Performed by: OPHTHALMOLOGY

## 2023-12-06 ASSESSMENT — REFRACTION_AUTOREFRACTION
OD_AXIS: 175
OD_SPHERE: -1.00
OS_CYLINDER: +1.00
OS_SPHERE: -0.75
OS_AXIS: 165
OD_CYLINDER: +0.50

## 2023-12-06 ASSESSMENT — SPHEQUIV_DERIVED
OS_SPHEQUIV: -0.25
OD_SPHEQUIV: -0.75

## 2023-12-06 ASSESSMENT — CONFRONTATIONAL VISUAL FIELD TEST (CVF)
OS_FINDINGS: FULL
OD_FINDINGS: FULL

## 2023-12-06 ASSESSMENT — SUPERFICIAL PUNCTATE KERATITIS (SPK)
OD_SPK: 2+
OS_SPK: T

## 2024-07-23 ENCOUNTER — OFFICE (OUTPATIENT)
Dept: URBAN - METROPOLITAN AREA CLINIC 121 | Facility: CLINIC | Age: 69
Setting detail: OPHTHALMOLOGY
End: 2024-07-23
Payer: MEDICARE

## 2024-07-23 DIAGNOSIS — H26.493: ICD-10-CM

## 2024-07-23 DIAGNOSIS — H43.813: ICD-10-CM

## 2024-07-23 DIAGNOSIS — Z96.1: ICD-10-CM

## 2024-07-23 DIAGNOSIS — H40.013: ICD-10-CM

## 2024-07-23 DIAGNOSIS — H35.40: ICD-10-CM

## 2024-07-23 DIAGNOSIS — H16.223: ICD-10-CM

## 2024-07-23 DIAGNOSIS — H35.363: ICD-10-CM

## 2024-07-23 DIAGNOSIS — H35.033: ICD-10-CM

## 2024-07-23 DIAGNOSIS — E11.9: ICD-10-CM

## 2024-07-23 DIAGNOSIS — H43.393: ICD-10-CM

## 2024-07-23 DIAGNOSIS — H35.3131: ICD-10-CM

## 2024-07-23 PROCEDURE — 99213 OFFICE O/P EST LOW 20 MIN: CPT | Performed by: OPHTHALMOLOGY

## 2024-07-23 PROCEDURE — 92134 CPTRZ OPH DX IMG PST SGM RTA: CPT | Performed by: OPHTHALMOLOGY

## 2024-07-23 ASSESSMENT — CONFRONTATIONAL VISUAL FIELD TEST (CVF)
OD_FINDINGS: FULL
OS_FINDINGS: FULL

## 2025-01-13 ENCOUNTER — OFFICE (OUTPATIENT)
Facility: LOCATION | Age: 70
Setting detail: OPHTHALMOLOGY
End: 2025-01-13
Payer: MEDICARE

## 2025-01-13 DIAGNOSIS — H02.011: ICD-10-CM

## 2025-01-13 DIAGNOSIS — H43.393: ICD-10-CM

## 2025-01-13 DIAGNOSIS — H35.363: ICD-10-CM

## 2025-01-13 DIAGNOSIS — H35.033: ICD-10-CM

## 2025-01-13 DIAGNOSIS — H35.40: ICD-10-CM

## 2025-01-13 DIAGNOSIS — H40.013: ICD-10-CM

## 2025-01-13 DIAGNOSIS — H43.813: ICD-10-CM

## 2025-01-13 DIAGNOSIS — E11.9: ICD-10-CM

## 2025-01-13 DIAGNOSIS — H26.493: ICD-10-CM

## 2025-01-13 DIAGNOSIS — H35.3131: ICD-10-CM

## 2025-01-13 DIAGNOSIS — H16.223: ICD-10-CM

## 2025-01-13 PROCEDURE — 99214 OFFICE O/P EST MOD 30 MIN: CPT | Performed by: OPHTHALMOLOGY

## 2025-01-13 PROCEDURE — 92250 FUNDUS PHOTOGRAPHY W/I&R: CPT | Performed by: OPHTHALMOLOGY

## 2025-01-13 ASSESSMENT — REFRACTION_AUTOREFRACTION
OS_SPHERE: -0.50
OS_AXIS: 166
OD_CYLINDER: +0.50
OS_CYLINDER: +0.75
OD_SPHERE: -0.50
OD_AXIS: 155

## 2025-01-13 ASSESSMENT — KERATOMETRY
OS_K1POWER_DIOPTERS: 41.50
OS_AXISANGLE_DEGREES: 162
OD_AXISANGLE_DEGREES: 162
OS_K2POWER_DIOPTERS: 42.50
METHOD_AUTO_MANUAL: AUTO
OD_K2POWER_DIOPTERS: 42.50
OD_K1POWER_DIOPTERS: 42.00

## 2025-01-13 ASSESSMENT — CONFRONTATIONAL VISUAL FIELD TEST (CVF)
OS_FINDINGS: FULL
OD_FINDINGS: FULL

## 2025-01-13 ASSESSMENT — PACHYMETRY
OS_CT_UM: 551
OD_CT_UM: 559
OS_CT_CORRECTION: -1
OD_CT_CORRECTION: -1

## 2025-01-13 ASSESSMENT — VISUAL ACUITY
OD_BCVA: 20/25
OS_BCVA: 20/50

## 2025-01-13 ASSESSMENT — TONOMETRY
OD_IOP_MMHG: 17
OS_IOP_MMHG: 17

## 2025-01-13 ASSESSMENT — LID EXAM ASSESSMENTS: OD_TRICHIASIS: RUL 1+

## 2025-01-13 ASSESSMENT — SUPERFICIAL PUNCTATE KERATITIS (SPK)
OS_SPK: T
OD_SPK: 2+

## 2025-05-21 ENCOUNTER — OFFICE (OUTPATIENT)
Facility: LOCATION | Age: 70
Setting detail: OPHTHALMOLOGY
End: 2025-05-21
Payer: MEDICARE

## 2025-05-21 ENCOUNTER — RX ONLY (RX ONLY)
Age: 70
End: 2025-05-21

## 2025-05-21 DIAGNOSIS — H35.40: ICD-10-CM

## 2025-05-21 DIAGNOSIS — H35.033: ICD-10-CM

## 2025-05-21 DIAGNOSIS — H35.363: ICD-10-CM

## 2025-05-21 DIAGNOSIS — H43.813: ICD-10-CM

## 2025-05-21 DIAGNOSIS — H16.223: ICD-10-CM

## 2025-05-21 DIAGNOSIS — H40.013: ICD-10-CM

## 2025-05-21 DIAGNOSIS — H43.393: ICD-10-CM

## 2025-05-21 DIAGNOSIS — H26.493: ICD-10-CM

## 2025-05-21 DIAGNOSIS — E11.9: ICD-10-CM

## 2025-05-21 DIAGNOSIS — H02.011: ICD-10-CM

## 2025-05-21 DIAGNOSIS — H35.3131: ICD-10-CM

## 2025-05-21 PROCEDURE — 92134 CPTRZ OPH DX IMG PST SGM RTA: CPT | Performed by: OPHTHALMOLOGY

## 2025-05-21 PROCEDURE — 92014 COMPRE OPH EXAM EST PT 1/>: CPT | Performed by: OPHTHALMOLOGY

## 2025-05-21 ASSESSMENT — CONFRONTATIONAL VISUAL FIELD TEST (CVF)
OS_FINDINGS: FULL
OD_FINDINGS: FULL

## 2025-05-21 ASSESSMENT — VISUAL ACUITY
OD_BCVA: 20/25
OS_BCVA: 20/30

## 2025-05-21 ASSESSMENT — LID EXAM ASSESSMENTS: OD_TRICHIASIS: RUL 1+

## 2025-05-21 ASSESSMENT — SUPERFICIAL PUNCTATE KERATITIS (SPK)
OS_SPK: T
OD_SPK: T

## 2025-05-21 ASSESSMENT — REFRACTION_AUTOREFRACTION
OS_AXIS: 166
OD_CYLINDER: +0.50
OD_AXIS: 155
OD_SPHERE: -0.50
OS_CYLINDER: +0.75
OS_SPHERE: -0.50

## 2025-05-21 ASSESSMENT — PACHYMETRY
OS_CT_CORRECTION: -1
OD_CT_UM: 559
OS_CT_UM: 551
OD_CT_CORRECTION: -1

## 2025-05-21 ASSESSMENT — KERATOMETRY
OS_K1POWER_DIOPTERS: 41.50
METHOD_AUTO_MANUAL: AUTO
OD_AXISANGLE_DEGREES: 162
OS_AXISANGLE_DEGREES: 162
OD_K1POWER_DIOPTERS: 42.00
OS_K2POWER_DIOPTERS: 42.50
OD_K2POWER_DIOPTERS: 42.50

## 2025-05-21 ASSESSMENT — TONOMETRY
OS_IOP_MMHG: 14
OD_IOP_MMHG: 14